# Patient Record
Sex: MALE | Race: BLACK OR AFRICAN AMERICAN | NOT HISPANIC OR LATINO | Employment: STUDENT | URBAN - METROPOLITAN AREA
[De-identification: names, ages, dates, MRNs, and addresses within clinical notes are randomized per-mention and may not be internally consistent; named-entity substitution may affect disease eponyms.]

---
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
## 2024-03-14 NOTE — ASSESSMENT & PLAN NOTE
Asthma is stable and controlled.   Continue current treatment plan/medication.        
Detail Level: Generalized
Detail Level: Zone
Detail Level: Detailed
Detail Level: Simple

## 2024-06-15 ENCOUNTER — HOSPITAL ENCOUNTER (EMERGENCY)
Facility: HOSPITAL | Age: 19
Discharge: HOME | End: 2024-06-15
Attending: FAMILY MEDICINE
Payer: COMMERCIAL

## 2024-06-15 ENCOUNTER — APPOINTMENT (OUTPATIENT)
Facility: HOSPITAL | Age: 19
End: 2024-06-15
Payer: COMMERCIAL

## 2024-06-15 VITALS
HEART RATE: 47 BPM | WEIGHT: 194 LBS | HEIGHT: 73 IN | DIASTOLIC BLOOD PRESSURE: 72 MMHG | OXYGEN SATURATION: 100 % | RESPIRATION RATE: 16 BRPM | SYSTOLIC BLOOD PRESSURE: 135 MMHG | BODY MASS INDEX: 25.71 KG/M2 | TEMPERATURE: 98.4 F

## 2024-06-15 DIAGNOSIS — R07.9 CHEST PAIN, UNSPECIFIED TYPE: Primary | ICD-10-CM

## 2024-06-15 LAB
ALBUMIN SERPL-MCNC: 4.5 G/DL (ref 3.5–5.2)
ALBUMIN/GLOB SERPL: 1.7 G/DL
ALP SERPL-CCNC: 157 U/L (ref 39–117)
ALT SERPL W P-5'-P-CCNC: 10 U/L (ref 1–41)
ANION GAP SERPL CALCULATED.3IONS-SCNC: 12.1 MMOL/L (ref 5–15)
AST SERPL-CCNC: 24 U/L (ref 1–40)
BASOPHILS # BLD AUTO: 0.02 10*3/MM3 (ref 0–0.2)
BASOPHILS NFR BLD AUTO: 0.3 % (ref 0–1.5)
BILIRUB SERPL-MCNC: 1 MG/DL (ref 0–1.2)
BUN SERPL-MCNC: 14 MG/DL (ref 6–20)
BUN/CREAT SERPL: 13.1 (ref 7–25)
CALCIUM SPEC-SCNC: 9.4 MG/DL (ref 8.6–10.5)
CHLORIDE SERPL-SCNC: 104 MMOL/L (ref 98–107)
CO2 SERPL-SCNC: 24.9 MMOL/L (ref 22–29)
CREAT SERPL-MCNC: 1.07 MG/DL (ref 0.76–1.27)
D DIMER PPP FEU-MCNC: 0.31 MCGFEU/ML (ref 0–0.5)
DEPRECATED RDW RBC AUTO: 44.9 FL (ref 37–54)
EGFRCR SERPLBLD CKD-EPI 2021: 102.5 ML/MIN/1.73
EOSINOPHIL # BLD AUTO: 0.01 10*3/MM3 (ref 0–0.4)
EOSINOPHIL NFR BLD AUTO: 0.2 % (ref 0.3–6.2)
ERYTHROCYTE [DISTWIDTH] IN BLOOD BY AUTOMATED COUNT: 14 % (ref 12.3–15.4)
GEN 5 2HR TROPONIN T REFLEX: 7 NG/L
GLOBULIN UR ELPH-MCNC: 2.7 GM/DL
GLUCOSE SERPL-MCNC: 94 MG/DL (ref 65–99)
HCT VFR BLD AUTO: 40 % (ref 37.5–51)
HGB BLD-MCNC: 13 G/DL (ref 13–17.7)
HOLD SPECIMEN: NORMAL
IMM GRANULOCYTES # BLD AUTO: 0 10*3/MM3 (ref 0–0.05)
IMM GRANULOCYTES NFR BLD AUTO: 0 % (ref 0–0.5)
LIPASE SERPL-CCNC: 25 U/L (ref 13–60)
LYMPHOCYTES # BLD AUTO: 1.33 10*3/MM3 (ref 0.7–3.1)
LYMPHOCYTES NFR BLD AUTO: 22.3 % (ref 19.6–45.3)
MCH RBC QN AUTO: 27.7 PG (ref 26.6–33)
MCHC RBC AUTO-ENTMCNC: 32.5 G/DL (ref 31.5–35.7)
MCV RBC AUTO: 85.1 FL (ref 79–97)
MONOCYTES # BLD AUTO: 0.33 10*3/MM3 (ref 0.1–0.9)
MONOCYTES NFR BLD AUTO: 5.5 % (ref 5–12)
NEUTROPHILS NFR BLD AUTO: 4.27 10*3/MM3 (ref 1.7–7)
NEUTROPHILS NFR BLD AUTO: 71.7 % (ref 42.7–76)
NT-PROBNP SERPL-MCNC: <36 PG/ML (ref 0–450)
PLATELET # BLD AUTO: 166 10*3/MM3 (ref 140–450)
PMV BLD AUTO: 11.1 FL (ref 6–12)
POTASSIUM SERPL-SCNC: 3.9 MMOL/L (ref 3.5–5.2)
PROT SERPL-MCNC: 7.2 G/DL (ref 6–8.5)
RBC # BLD AUTO: 4.7 10*6/MM3 (ref 4.14–5.8)
SODIUM SERPL-SCNC: 141 MMOL/L (ref 136–145)
TROPONIN T DELTA: -1 NG/L
TROPONIN T SERPL HS-MCNC: 8 NG/L
WBC NRBC COR # BLD AUTO: 5.96 10*3/MM3 (ref 3.4–10.8)
WHOLE BLOOD HOLD COAG: NORMAL
WHOLE BLOOD HOLD SPECIMEN: NORMAL

## 2024-06-15 PROCEDURE — 83690 ASSAY OF LIPASE: CPT | Performed by: FAMILY MEDICINE

## 2024-06-15 PROCEDURE — 83880 ASSAY OF NATRIURETIC PEPTIDE: CPT | Performed by: FAMILY MEDICINE

## 2024-06-15 PROCEDURE — 93005 ELECTROCARDIOGRAM TRACING: CPT | Performed by: FAMILY MEDICINE

## 2024-06-15 PROCEDURE — 80053 COMPREHEN METABOLIC PANEL: CPT | Performed by: FAMILY MEDICINE

## 2024-06-15 PROCEDURE — 85379 FIBRIN DEGRADATION QUANT: CPT | Performed by: PHYSICIAN ASSISTANT

## 2024-06-15 PROCEDURE — 71045 X-RAY EXAM CHEST 1 VIEW: CPT

## 2024-06-15 PROCEDURE — 84484 ASSAY OF TROPONIN QUANT: CPT | Performed by: FAMILY MEDICINE

## 2024-06-15 PROCEDURE — 99284 EMERGENCY DEPT VISIT MOD MDM: CPT

## 2024-06-15 PROCEDURE — 85025 COMPLETE CBC W/AUTO DIFF WBC: CPT | Performed by: FAMILY MEDICINE

## 2024-06-15 PROCEDURE — 36415 COLL VENOUS BLD VENIPUNCTURE: CPT

## 2024-06-15 RX ORDER — SODIUM CHLORIDE 0.9 % (FLUSH) 0.9 %
10 SYRINGE (ML) INJECTION AS NEEDED
Status: DISCONTINUED | OUTPATIENT
Start: 2024-06-15 | End: 2024-06-15 | Stop reason: HOSPADM

## 2024-06-15 RX ORDER — ASPIRIN 81 MG/1
324 TABLET, CHEWABLE ORAL ONCE
Status: DISCONTINUED | OUTPATIENT
Start: 2024-06-15 | End: 2024-06-15 | Stop reason: HOSPADM

## 2024-06-16 NOTE — FSED PROVIDER NOTE
"Subjective  History of Present Illness:    Patient is a 18-year-old male presenting to the emergency department complaints of chest pain.  He reports he has had intermittent episodes of substernal chest pain over the past 2 days.  He states he does have some difficulty breathing when the pain occurs.  He states he feels like he has to focus on his breathing.  He states it resolves on its own without intervention.  He denies worsening pain with breathing, exercise. Of note, patient recently completed treatment for Hodgkin's lymphoma, he follows at the [facility] for treatment.  No prior cardiac history.      Nurses Notes reviewed and agree, including vitals, allergies, social history and prior medical history.     REVIEW OF SYSTEMS: All systems reviewed and not pertinent unless noted.  Review of Systems   Respiratory:  Positive for chest tightness.    Cardiovascular:  Positive for chest pain.   All other systems reviewed and are negative.      History reviewed. No pertinent past medical history.    Allergies:    Bactrim [sulfamethoxazole-trimethoprim] and Adhesive tape      History reviewed. No pertinent surgical history.      Social History     Socioeconomic History    Marital status: Single   Tobacco Use    Smoking status: Never    Smokeless tobacco: Never   Vaping Use    Vaping status: Never Used   Substance and Sexual Activity    Alcohol use: Yes     Comment: socially    Drug use: Never    Sexual activity: Defer         History reviewed. No pertinent family history.    Objective  Physical Exam:  /72   Pulse (!) 47   Temp 98.4 °F (36.9 °C) (Oral)   Resp 16   Ht 185.4 cm (73\")   Wt 88 kg (194 lb)   SpO2 100%   BMI 25.60 kg/m²      Physical Exam  Vitals and nursing note reviewed.   Constitutional:       Appearance: Normal appearance. He is normal weight. He is not toxic-appearing.   HENT:      Head: Normocephalic and atraumatic.      Nose: Nose normal.      Mouth/Throat:      Mouth: Mucous " membranes are moist.      Pharynx: Oropharynx is clear.   Eyes:      Extraocular Movements: Extraocular movements intact.      Conjunctiva/sclera: Conjunctivae normal.      Pupils: Pupils are equal, round, and reactive to light.   Cardiovascular:      Rate and Rhythm: Normal rate and regular rhythm.      Pulses: Normal pulses.      Heart sounds: Normal heart sounds.   Pulmonary:      Effort: Pulmonary effort is normal.      Breath sounds: Normal breath sounds.   Musculoskeletal:         General: Normal range of motion.      Cervical back: Normal range of motion and neck supple.   Skin:     General: Skin is warm and dry.      Capillary Refill: Capillary refill takes less than 2 seconds.   Neurological:      General: No focal deficit present.      Mental Status: He is alert and oriented to person, place, and time. Mental status is at baseline.   Psychiatric:         Mood and Affect: Mood normal.         Behavior: Behavior normal.         Thought Content: Thought content normal.         Judgment: Judgment normal.         Procedures    ED Course:    Labs and imaging were obtained for this patient.  High-sensitivity troponin is within normal limits.  EKG is unremarkable.  D-dimer is negative.  Chest x-ray is negative for acute findings.  Symptoms have resolved at this time and are intermittent in nature.  I did advise patient to have follow-up with cardiology if he had not had a recent echo as he is recently finished treatment for Hodgkin's lymphoma.  Advise return to the emergency department if chest pain worsened.  Patient understands and agrees with plan of care.  He is well-appearing with stable vitals at time of discharge    ED Course as of 06/16/24 0016   Sat Orlando 15, 2024   2007 EKG is sinus bradycardia with a rate of 48 bpm FL interval is 152 ms QRS duration is 94 ms QT/QTc is 430/384 ms no acute abnormalities [EG]      ED Course User Index  [EG] Ceci Harvey,        Lab Results (last 24 hours)        Procedure Component Value Units Date/Time    High Sensitivity Troponin T [[id number]]  (Normal) Collected: 06/15/24 1756    Specimen: Blood Updated: 06/15/24 1819     HS Troponin T 8 ng/L     CBC & Differential [[id number]]  (Abnormal) Collected: 06/15/24 1756    Specimen: Blood Updated: 06/15/24 1802    Narrative:      The following orders were created for panel order CBC & Differential.  Procedure                               Abnormality         Status                     ---------                               -----------         ------                     CBC Auto Differential[[id number]]        Abnormal            Final result                 Please view results for these tests on the individual orders.    Comprehensive Metabolic Panel [[id number]]  (Abnormal) Collected: 06/15/24 1756    Specimen: Blood Updated: 06/15/24 1822     Glucose 94 mg/dL      BUN 14 mg/dL      Creatinine 1.07 mg/dL      Sodium 141 mmol/L      Potassium 3.9 mmol/L      Chloride 104 mmol/L      CO2 24.9 mmol/L      Calcium 9.4 mg/dL      Total Protein 7.2 g/dL      Albumin 4.5 g/dL      ALT (SGPT) 10 U/L      AST (SGOT) 24 U/L      Alkaline Phosphatase 157 U/L      Total Bilirubin 1.0 mg/dL      Globulin 2.7 gm/dL      A/G Ratio 1.7 g/dL      BUN/Creatinine Ratio 13.1     Anion Gap 12.1 mmol/L      eGFR 102.5 mL/min/1.73     Narrative:      GFR Normal >60  Chronic Kidney Disease <60  Kidney Failure <15      Lipase [[id number]]  (Normal) Collected: 06/15/24 1756    Specimen: Blood Updated: 06/15/24 1822     Lipase 25 U/L     BNP [[id number]]  (Normal) Collected: 06/15/24 1756    Specimen: Blood Updated: 06/15/24 1820     proBNP <36.0 pg/mL     Narrative:      This assay is used as an aid in the diagnosis of individuals suspected of having heart failure. It can be used as an aid in the diagnosis of acute decompensated heart failure (ADHF) in patients presenting with signs and symptoms of ADHF to the emergency department (ED). In addition,  NT-proBNP of <300 pg/mL indicates ADHF is not likely.    Age Range Result Interpretation  NT-proBNP Concentration (pg/mL:      <50             Positive            >450                   Gray                 300-450                    Negative             <300    50-75           Positive            >900                  Gray                300-900                  Negative            <300      >75             Positive            >1800                  Gray                300-1800                  Negative            <300    CBC Auto Differential [[id number]]  (Abnormal) Collected: 06/15/24 1756    Specimen: Blood Updated: 06/15/24 1802     WBC 5.96 10*3/mm3      RBC 4.70 10*6/mm3      Hemoglobin 13.0 g/dL      Hematocrit 40.0 %      MCV 85.1 fL      MCH 27.7 pg      MCHC 32.5 g/dL      RDW 14.0 %      RDW-SD 44.9 fl      MPV 11.1 fL      Platelets 166 10*3/mm3      Neutrophil % 71.7 %      Lymphocyte % 22.3 %      Monocyte % 5.5 %      Eosinophil % 0.2 %      Basophil % 0.3 %      Immature Grans % 0.0 %      Neutrophils, Absolute 4.27 10*3/mm3      Lymphocytes, Absolute 1.33 10*3/mm3      Monocytes, Absolute 0.33 10*3/mm3      Eosinophils, Absolute 0.01 10*3/mm3      Basophils, Absolute 0.02 10*3/mm3      Immature Grans, Absolute 0.00 10*3/mm3     D-dimer, Quantitative [[id number]]  (Normal) Collected: 06/15/24 1756    Specimen: Blood Updated: 06/15/24 1905     D-Dimer, Quantitative 0.31 MCGFEU/mL     Narrative:      According to the assay 's published package insert, a normal (<0.50 MCGFEU/mL) D-dimer result in conjunction with a non-high clinical probability assessment, excludes deep vein thrombosis (DVT) and pulmonary embolism (PE) with high sensitivity.    D-dimer values increase with age and this can make VTE exclusion of an older population difficult. To address this, the American College of [employer], based on best available evidence and recent guidelines, recommends that clinicians use age-adjusted  "D-dimer thresholds in patients greater than 50 years of age with: a) a low probability of PE who do not meet all Pulmonary Embolism Rule Out Criteria, or b) in those with intermediate probability of PE.   The formula for an age-adjusted D-dimer cut-off is \"age/100\".  For example, a 60 year old patient would have an age-adjusted cut-off of 0.60 MCGFEU/mL and an 80 year old 0.80 MCGFEU/mL.    High Sensitivity Troponin T 2Hr [[id number]]  (Normal) Collected: 06/15/24 1951    Specimen: Blood Updated: 06/15/24 2015     HS Troponin T 7 ng/L      Troponin T Delta -1 ng/L              XR Chest 1 View    Result Date: 6/15/2024  XR CHEST 1 VW Date of Exam: 6/15/2024 6:23 PM EDT Indication: Chest Pain Triage Protocol Comparison: None available. Findings: Heart size and pulmonary vasculature within normal limits. Lungs clear. Costophrenic angle sharp     Impression: Impression: No active cardiopulmonary disease Electronically Signed: Juwan Castillo  6/15/2024 6:50 PM EDT  Workstation ID: [id number]        [facility]     Amount and/or Complexity of Data Reviewed  Clinical lab tests: reviewed  Tests in the radiology section of CPT®: reviewed  Tests in the medicine section of CPT®: reviewed          DDX: includes but is not limited to: Anxiety, pleurisy, pulmonary embolism, pneumonia, cardiomyopathy    Patient arrives POV with vitals interpreted by myself.     Medications - No data to display      Results/clinical rationale were discussed with Dr. Mckeon    -----  ED Disposition       ED Disposition   Discharge    Condition   Stable    Comment   --             Final diagnoses:   Chest pain, unspecified type      Your Follow-Up Providers       Provider, No Known In 2 days.    Follow up details: recheck of symptoms  [facility] [id number]                       Contact information for after-discharge care    Follow-up information has not been specified.                    Your medication list      as of Lilian 15, 2024  8:51 " PM     You have not been prescribed any medications.

## 2024-06-17 LAB
QT INTERVAL: 406 MS
QT INTERVAL: 430 MS
QTC INTERVAL: 384 MS
QTC INTERVAL: 395 MS